# Patient Record
Sex: FEMALE | Race: WHITE | Employment: UNEMPLOYED | URBAN - METROPOLITAN AREA
[De-identification: names, ages, dates, MRNs, and addresses within clinical notes are randomized per-mention and may not be internally consistent; named-entity substitution may affect disease eponyms.]

---

## 2017-02-01 ENCOUNTER — OFFICE VISIT (OUTPATIENT)
Dept: FAMILY MEDICINE | Facility: CLINIC | Age: 22
End: 2017-02-01
Payer: COMMERCIAL

## 2017-02-01 VITALS
BODY MASS INDEX: 44.22 KG/M2 | OXYGEN SATURATION: 98 % | HEART RATE: 101 BPM | TEMPERATURE: 98 F | HEIGHT: 65 IN | DIASTOLIC BLOOD PRESSURE: 76 MMHG | WEIGHT: 265.4 LBS | SYSTOLIC BLOOD PRESSURE: 118 MMHG

## 2017-02-01 DIAGNOSIS — Z30.09 COUNSELING FOR BIRTH CONTROL, INTRAUTERINE DEVICE: Primary | ICD-10-CM

## 2017-02-01 DIAGNOSIS — Z11.3 SCREEN FOR STD (SEXUALLY TRANSMITTED DISEASE): ICD-10-CM

## 2017-02-01 PROCEDURE — 87491 CHLMYD TRACH DNA AMP PROBE: CPT | Performed by: FAMILY MEDICINE

## 2017-02-01 PROCEDURE — 87591 N.GONORRHOEAE DNA AMP PROB: CPT | Performed by: FAMILY MEDICINE

## 2017-02-01 PROCEDURE — 99213 OFFICE O/P EST LOW 20 MIN: CPT | Performed by: FAMILY MEDICINE

## 2017-02-01 NOTE — NURSING NOTE
"Chief Complaint   Patient presents with     Consult     IUD     /76 mmHg  Pulse 101  Temp(Src) 98  F (36.7  C) (Oral)  Ht 5' 5\" (1.651 m)  Wt 265 lb 6.4 oz (120.385 kg)  BMI 44.16 kg/m2  SpO2 98% Estimated body mass index is 44.16 kg/(m^2) as calculated from the following:    Height as of this encounter: 5' 5\" (1.651 m).    Weight as of this encounter: 265 lb 6.4 oz (120.385 kg).  BP completed using cuff size: large       Health Maintenance due pending provider review:  NONE    n/a    Emily Jimenez CMA    "

## 2017-02-01 NOTE — PROGRESS NOTES
"  SUBJECTIVE:                                                    Therese Larios is a 21 year old female who presents to clinic today for the following health issues:      IUD consult, pt would like to discuss Mirena    Menses last 4-5 days and getting q28 days  Heavy in the middle - will change super tampons q2 hours  Gets heavy if traveling  LMP 1/14/17      -------------------------------------    Problem list and histories reviewed & adjusted, as indicated.  Additional history: as documented    Problem list, Medication list, Allergies, and Medical/Social/Surgical histories reviewed in [facility] and updated as appropriate.    ROS:  Constitutional, HEENT, cardiovascular, pulmonary, gi and gu systems are negative, except as otherwise noted.    OBJECTIVE:                                                    /76 mmHg  Pulse 101  Temp(Src) 98  F (36.7  C) (Oral)  Ht 5' 5\" (1.651 m)  Wt 265 lb 6.4 oz (120.385 kg)  BMI 44.16 kg/m2  SpO2 98%  Body mass index is 44.16 kg/(m^2).  GENERAL: healthy, alert and no distress    Diagnostic Test Results:  pending     ASSESSMENT/PLAN:                                                    1. Counseling for birth control, intrauterine device   Reviewed the risk/benefits of IUD-including but not limiting to bleeding, infection and uterine perforation.  Discussed the differences between the two types of IUDs - Mirena vs paraguard vs Sklya-   Pt mostly interested in Mirena  Advised to take 3-4 ibuprofen one hour prior to appointment.      2. Screen for STD (sexually transmitted disease)     - NEISSERIA GONORRHOEA PCR  - CHLAMYDIA TRACHOMATIS PCR    I spent over 20 minutes with patient and over 50% time in counseling regarding above issues.     Sienna Ferreira, [facility]    "

## 2017-02-02 LAB
C TRACH DNA SPEC QL NAA+PROBE: NORMAL
N GONORRHOEA DNA SPEC QL NAA+PROBE: NORMAL
SPECIMEN SOURCE: NORMAL
SPECIMEN SOURCE: NORMAL

## 2017-02-04 NOTE — PROGRESS NOTES
Quick Note:    Please send letter that labs were normal with copy of results.  Thanks  PN    ______

## 2017-02-28 ENCOUNTER — OFFICE VISIT (OUTPATIENT)
Dept: FAMILY MEDICINE | Facility: CLINIC | Age: 22
End: 2017-02-28
Payer: COMMERCIAL

## 2017-02-28 VITALS
SYSTOLIC BLOOD PRESSURE: 108 MMHG | HEIGHT: 65 IN | WEIGHT: 260.3 LBS | HEART RATE: 72 BPM | TEMPERATURE: 98.1 F | DIASTOLIC BLOOD PRESSURE: 70 MMHG | BODY MASS INDEX: 43.37 KG/M2

## 2017-02-28 DIAGNOSIS — Z97.5 IUD (INTRAUTERINE DEVICE) IN PLACE: ICD-10-CM

## 2017-02-28 DIAGNOSIS — Z30.430 ENCOUNTER FOR IUD INSERTION: Primary | ICD-10-CM

## 2017-02-28 DIAGNOSIS — Z01.818 PREPROCEDURAL EXAMINATION: ICD-10-CM

## 2017-02-28 LAB — BETA HCG QUAL IFA URINE: NEGATIVE

## 2017-02-28 PROCEDURE — 58300 INSERT INTRAUTERINE DEVICE: CPT | Performed by: FAMILY MEDICINE

## 2017-02-28 PROCEDURE — 84703 CHORIONIC GONADOTROPIN ASSAY: CPT | Performed by: FAMILY MEDICINE

## 2017-02-28 NOTE — MR AVS SNAPSHOT
"              After Visit Summary   2/28/2017    Therese Larios    MRN: [id number]           Patient Information     Date Of Birth          1995        Visit Information        Provider Department      2/28/2017 9:30 AM Sienna Ferreira DO [facility]        Today's Diagnoses     Encounter for IUD insertion    -  1    Preprocedural examination        IUD (intrauterine device) in place           Follow-ups after your visit        Your next 10 appointments already scheduled     Apr 11, 2017 12:30 PM CDT   Office Visit with Sienna Ferreira DO   [facility] ([facility])    [address] [address] [phone]           Bring a current list of meds and any records pertaining to this visit.  For Physicals, please bring immunization records and any forms needing to be filled out.  Please arrive 10 minutes early to complete paperwork.              Who to contact     If you have questions or need follow up information about today's clinic visit or your schedule please contact [facility] directly at [phone].  Normal or non-critical lab and imaging results will be communicated to you by [employer], letter or phone within 4 business days after the clinic has received the results. If you do not hear from us within 7 days, please contact the clinic through [employer] or phone. If you have a critical or abnormal lab result, we will notify you by phone as soon as possible.  Submit refill requests through [employer] or call your pharmacy and they will forward the refill request to us. Please allow 3 business days for your refill to be completed.          Additional Information About Your Visit        MyChart Information     [employer] lets you send messages to your doctor, view your test results, renew your prescriptions, schedule appointments and more. To sign up, go to [email or web address] . Click on \"Log in\" on the left side of the screen, " "which will take you to the Welcome page. Then click on \"Sign up Now\" on the right side of the page.     You will be asked to enter the access code listed below, as well as some personal information. Please follow the directions to create your username and password.     Your access code is: [email or web address]  Expires: 2017 10:38 AM     Your access code will  in 90 days. If you need help or a new code, please call your [facility] or [phone].        Care EveryWhere ID     This is your Care EveryWhere ID. This could be used by other organizations to access your [facility] medical records  [id number]        Your Vitals Were     Pulse Temperature Height Last Period BMI (Body Mass Index)       72 98.1  F (36.7  C) (Oral) 5' 5\" (1.651 m) 2017 43.32 kg/m2        Blood Pressure from Last 3 Encounters:   17 108/70   17 118/76   11/10/14 100/68    Weight from Last 3 Encounters:   17 260 lb 4.8 oz (118.1 kg) (>99 %)*   17 265 lb 6.4 oz (120.4 kg) (>99 %)*   11/10/14 235 lb (106.6 kg) (>99 %)*     * Growth percentiles are based on [facility] 2-20 Years data.              We Performed the Following     Beta HCG qual IFA urine     HC LEVONORGESTREL IU 52MG 5 YR     INSERTION INTRAUTERINE DEVICE          Today's Medication Changes          These changes are accurate as of: 17 10:38 AM.  If you have any questions, ask your nurse or doctor.               Start taking these medicines.        Dose/Directions    levonorgestrel 20 MCG/24HR IUD   Commonly known as:  MIRENA   Used for:  Encounter for IUD insertion   Started by:  Sienna Ferreira DO        Dose:  1 each   1 each (20 mcg) by Intrauterine route once for 1 dose Lot [id number]   Quantity:  1 each   Refills:  0            Where to get your medicines      Some of these will need a paper prescription and others can be bought over the counter.  Ask your nurse if you have questions.     You don't need a prescription for these medications     " levonorgestrel 20 MCG/24HR IUD                Primary Care Provider Office Phone # Fax #    TaborMarlene Fuentes [phone]       [facility] [address] [place] [id number]        Thank you!     Thank you for choosing [facility]  for your care. Our goal is always to provide you with excellent care. Hearing back from our patients is one way we can continue to improve our services. Please take a few minutes to complete the written survey that you may receive in the mail after your visit with us. Thank you!             Your Updated Medication List - Protect others around you: Learn how to safely use, store and throw away your medicines at www.disposemymeds.org.          This list is accurate as of: 2/28/17 10:38 AM.  Always use your most recent med list.                   Brand Name Dispense Instructions for use    levonorgestrel 20 MCG/24HR IUD    MIRENA    1 each    1 each (20 mcg) by Intrauterine route once for 1 dose Lot [id number]

## 2017-02-28 NOTE — NURSING NOTE
"Chief Complaint   Patient presents with     IUD     mirena     /70  Pulse 72  Temp 98.1  F (36.7  C) (Oral)  Ht 5' 5\" (1.651 m)  Wt 260 lb 4.8 oz (118.1 kg)  LMP 02/17/2017  BMI 43.32 kg/m2 Estimated body mass index is 43.32 kg/(m^2) as calculated from the following:    Height as of this encounter: 5' 5\" (1.651 m).    Weight as of this encounter: 260 lb 4.8 oz (118.1 kg).  bp completed using cuff size: large      Health Maintenance that is potentially due pending provider review:  NONE    n/a  Jelena Carvajal M.A.    "

## 2017-02-28 NOTE — PROGRESS NOTES
Therese Larios is a 21 year old female who presents today requesting placement of an Mirena iud.    The patient meets and is agreeable to the following conditions:  She is not interested in conception in the near future.   She currently is in a stable, monogamous relationship.   There is no previous history of pelvic inflammatory disease.   There is no previous history of ectopic pregnancy.   She is willing to check monthly for the IUD string.   She is at least 8 weeks post-partum.   There is no history of unresolved abnormal uterine bleeding.   There is no history of an unresolved abnormal PAP smear.   She has no history of Reyes's disease or an allergy to copper (for ParaGard).   She has no history of diabetes, AIDS, leukemia, IV drug use or chronic steroid use.   She is willing to return annually for PAP smears.   She has had a PAP smear within the past 6 months.   She denies the possibility of pregnancy.   Pregnancy test today is negative.     The following risks were discussed with the patient:  Possibility of pregnancy and ectopic pregnancy.   Possibility of pelvic inflammatory disease, particularly with new partners.   Risk of uterine perforation or IUD expulsion.   Possibility of difficult removal.   Spotting or heavy bleeding.   Cramping, pain or infection during or after insertion.     The patient was given patient information on the IUD and the patient education brochure from the .   The patient has given consent to proceed with placement of the IUD.  A written consent form is present in the chart.   She wishes to proceed.    PROCEDURE:    Type of IUD: Mirena    Pt given 400mg ibuprofen just prior to procedure.    She is placed in a dorsal lithotomy potion and a pelvic exam is performed to determine the position of the uterus.  The cervix is identified and cleaned with betadine three times.  A single tooth tenaculum is applied to the anterior lip of the cervix for stabilization.  The  uterus is sounded to 8.0 cm and removed. (Target sound depth is 6.5 cm to 8.5 cm.)  The IUD insertion tube is prepared to manufacturers recommendations and inserted into the uterus under sterile conditions to the sounding depth.   The arms of the IUD are then opened by withdrawing the insertion tube 2.0 cm while stabilizing the solid insertion felicia without difficulty.  The IUD string is then cut to 2.5 cm.    The patient tolerated this procedure without immediate complication.  The patient is to return or call immediately for any unexplained fever, abdominal or pelvic pain, excessive bleeding, possibility of pregnancy, foul-smelling discharge, sense that the IUD has been expelled.    Sienna Ferreira